# Patient Record
Sex: FEMALE | Race: WHITE | NOT HISPANIC OR LATINO | URBAN - METROPOLITAN AREA
[De-identification: names, ages, dates, MRNs, and addresses within clinical notes are randomized per-mention and may not be internally consistent; named-entity substitution may affect disease eponyms.]

---

## 2017-07-18 ENCOUNTER — OFFICE (OUTPATIENT)
Dept: URBAN - METROPOLITAN AREA CLINIC 33 | Facility: CLINIC | Age: 72
End: 2017-07-18
Payer: OTHER GOVERNMENT

## 2017-07-18 ENCOUNTER — OFFICE (OUTPATIENT)
Dept: URBAN - METROPOLITAN AREA CLINIC 33 | Facility: CLINIC | Age: 72
End: 2017-07-18

## 2017-07-18 VITALS
SYSTOLIC BLOOD PRESSURE: 155 MMHG | HEART RATE: 83 BPM | WEIGHT: 137 LBS | DIASTOLIC BLOOD PRESSURE: 80 MMHG | HEIGHT: 67 IN | TEMPERATURE: 97.3 F

## 2017-07-18 DIAGNOSIS — R10.32 LEFT LOWER QUADRANT PAIN: ICD-10-CM

## 2017-07-18 DIAGNOSIS — Z86.010 PERSONAL HISTORY OF COLONIC POLYPS: ICD-10-CM

## 2017-07-18 PROCEDURE — 99203 OFFICE O/P NEW LOW 30 MIN: CPT

## 2017-07-18 PROCEDURE — 00031: CPT

## 2017-07-18 NOTE — SERVICEHPINOTES
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 who is being seen in consultation at the request of   ANA COHEN   for h/o adenomatous colon polyp when she was 48. Her last colonoscopy was in 2010 with 5-year recall advised. She has diverticulosis and tortuous colon. She takes a fiber supplement daily and has normal daily BM without constipation or straining. About 5 weeks ago she was having LLQ discomfort which has dissipated. She had seen her PCP at the time and had labs done. She has h/o occasional pains in LLQ and many years ago would use Levsin SL with relief. Currently the pain is infrequent so she doesn't feel need to take anything. No blood in stools, no diarrhea. Feels well overall and stays active. No h/o adverse cardiac events.

## 2017-08-31 ENCOUNTER — ON CAMPUS - OUTPATIENT (OUTPATIENT)
Dept: URBAN - METROPOLITAN AREA HOSPITAL 14 | Facility: HOSPITAL | Age: 72
End: 2017-08-31
Payer: OTHER GOVERNMENT

## 2017-08-31 DIAGNOSIS — Z86.010 PERSONAL HISTORY OF COLONIC POLYPS: ICD-10-CM

## 2017-08-31 DIAGNOSIS — D12.2 BENIGN NEOPLASM OF ASCENDING COLON: ICD-10-CM

## 2017-08-31 PROCEDURE — 45380 COLONOSCOPY AND BIOPSY: CPT | Mod: PT

## 2022-08-04 ENCOUNTER — OFFICE (OUTPATIENT)
Dept: URBAN - METROPOLITAN AREA CLINIC 34 | Facility: CLINIC | Age: 77
End: 2022-08-04

## 2022-08-04 PROCEDURE — 00031: CPT | Performed by: INTERNAL MEDICINE

## 2022-10-18 ENCOUNTER — OFFICE (OUTPATIENT)
Dept: URBAN - METROPOLITAN AREA TELEHEALTH 3 | Facility: TELEHEALTH | Age: 77
End: 2022-10-18
Payer: OTHER GOVERNMENT

## 2022-10-18 VITALS — WEIGHT: 132 LBS | HEIGHT: 66 IN

## 2022-10-18 DIAGNOSIS — Z86.010 PERSONAL HISTORY OF COLONIC POLYPS: ICD-10-CM

## 2022-10-18 DIAGNOSIS — I49.9 CARDIAC ARRHYTHMIA, UNSPECIFIED: ICD-10-CM

## 2022-10-18 DIAGNOSIS — R94.31 ABNORMAL ELECTROCARDIOGRAM [ECG] [EKG]: ICD-10-CM

## 2022-10-18 PROCEDURE — 99204 OFFICE O/P NEW MOD 45 MIN: CPT | Mod: 95 | Performed by: INTERNAL MEDICINE

## 2022-10-18 NOTE — SERVICEHPINOTES
PATIENT VERIFIED BY DATE OF BIRTH AND NAME. Patient has been consented for this telecommunication visit.  She had an abnormal recent EKG and was sent for a pet scan of the heart and she has to wear a Holter monitor for a few days.   Denies blood in stool.  Reports normal brown bowel movements without any blood.  Denies shortness of breath or chest pain.  Is very active daily by golfing and walking.  She also tap dances weeklybr

## 2023-01-19 ENCOUNTER — ON CAMPUS - OUTPATIENT (OUTPATIENT)
Dept: URBAN - METROPOLITAN AREA HOSPITAL 16 | Facility: HOSPITAL | Age: 78
End: 2023-01-19
Payer: OTHER GOVERNMENT

## 2023-01-19 DIAGNOSIS — Z86.010 PERSONAL HISTORY OF COLONIC POLYPS: ICD-10-CM

## 2023-01-19 DIAGNOSIS — K63.5 POLYP OF COLON: ICD-10-CM

## 2023-01-19 DIAGNOSIS — D12.2 BENIGN NEOPLASM OF ASCENDING COLON: ICD-10-CM

## 2023-01-19 PROCEDURE — 45380 COLONOSCOPY AND BIOPSY: CPT | Mod: 59 | Performed by: INTERNAL MEDICINE

## 2023-01-19 PROCEDURE — 45385 COLONOSCOPY W/LESION REMOVAL: CPT | Performed by: INTERNAL MEDICINE
